# Patient Record
Sex: MALE | Race: WHITE | Employment: OTHER | URBAN - METROPOLITAN AREA
[De-identification: names, ages, dates, MRNs, and addresses within clinical notes are randomized per-mention and may not be internally consistent; named-entity substitution may affect disease eponyms.]

---

## 2017-06-15 ENCOUNTER — HOSPITAL ENCOUNTER (OUTPATIENT)
Age: 52
Discharge: HOME | End: 2017-06-15
Payer: MEDICAID

## 2017-06-15 ENCOUNTER — TELEPHONE (OUTPATIENT)
Dept: FAMILY MEDICINE CLINIC | Facility: CLINIC | Age: 52
End: 2017-06-15

## 2017-06-15 VITALS
BODY MASS INDEX: 25.9 KG/M2 | DIASTOLIC BLOOD PRESSURE: 82 MMHG | HEART RATE: 83 BPM | TEMPERATURE: 99 F | OXYGEN SATURATION: 96 % | SYSTOLIC BLOOD PRESSURE: 133 MMHG | HEIGHT: 67 IN | RESPIRATION RATE: 18 BRPM | WEIGHT: 165 LBS

## 2017-06-15 DIAGNOSIS — K04.7 DENTAL ABSCESS: Primary | ICD-10-CM

## 2017-06-15 PROCEDURE — 85025 COMPLETE CBC W/AUTO DIFF WBC: CPT | Performed by: NURSE PRACTITIONER

## 2017-06-15 PROCEDURE — 96365 THER/PROPH/DIAG IV INF INIT: CPT

## 2017-06-15 PROCEDURE — S0077 INJECTION, CLINDAMYCIN PHOSP: HCPCS

## 2017-06-15 PROCEDURE — 99204 OFFICE O/P NEW MOD 45 MIN: CPT

## 2017-06-15 RX ORDER — CLINDAMYCIN PHOSPHATE 600 MG/50ML
600 INJECTION INTRAVENOUS ONCE
Status: COMPLETED | OUTPATIENT
Start: 2017-06-15 | End: 2017-06-15

## 2017-06-15 RX ORDER — CLINDAMYCIN HYDROCHLORIDE 300 MG/1
300 CAPSULE ORAL 3 TIMES DAILY
Qty: 30 CAPSULE | Refills: 0 | Status: SHIPPED | OUTPATIENT
Start: 2017-06-15 | End: 2017-06-25

## 2017-06-15 RX ORDER — CLINDAMYCIN PHOSPHATE 600 MG/50ML
600 INJECTION INTRAVENOUS EVERY 8 HOURS
Status: DISCONTINUED | OUTPATIENT
Start: 2017-06-15 | End: 2017-06-15

## 2017-06-15 NOTE — TELEPHONE ENCOUNTER
Kindly call patient and ask if he would like to follow up after IC visit and establish care with me as his PCP. Thank you.

## 2017-06-15 NOTE — ED PROVIDER NOTES
Patient Seen in: [facility]    History   Patient presents with:  Dental Problem (dental)    Stated Complaint: dental pain x 2 days    HPI  29-year-old male who presents to the immediate care with complaints of dental abscess for Negative. All other systems reviewed and are negative. Positive for stated complaint: dental pain x 2 days  Other systems are as noted in HPI. Constitutional and vital signs reviewed.       All other systems reviewed and negative except as noted ab Normal range of motion. Neurological: He is alert and oriented to person, place, and time. Skin: Skin is warm. Psychiatric: He has a normal mood and affect.  His behavior is normal. Judgment and thought content normal.   Nursing note and vitals review

## 2017-06-15 NOTE — ED INITIAL ASSESSMENT (HPI)
Patient presents with cc of \"rotten teeth\". Needs full set of dentures. States abscess right upper. No fever or chills. +Swelling to right face eye. States symptoms x 2 days. Low grade fever here. Got insurance recently.

## 2017-08-04 ENCOUNTER — HOSPITAL ENCOUNTER (OUTPATIENT)
Age: 52
Discharge: HOME | End: 2017-08-04
Attending: EMERGENCY MEDICINE
Payer: MEDICAID

## 2017-08-04 VITALS
SYSTOLIC BLOOD PRESSURE: 136 MMHG | OXYGEN SATURATION: 98 % | HEART RATE: 78 BPM | TEMPERATURE: 98 F | RESPIRATION RATE: 18 BRPM | WEIGHT: 170 LBS | HEIGHT: 67 IN | BODY MASS INDEX: 26.68 KG/M2 | DIASTOLIC BLOOD PRESSURE: 84 MMHG

## 2017-08-04 DIAGNOSIS — K04.7 DENTAL ABSCESS: Primary | ICD-10-CM

## 2017-08-04 PROCEDURE — 99213 OFFICE O/P EST LOW 20 MIN: CPT

## 2017-08-04 PROCEDURE — 99214 OFFICE O/P EST MOD 30 MIN: CPT

## 2017-08-04 RX ORDER — AMOXICILLIN 875 MG/1
875 TABLET, COATED ORAL 2 TIMES DAILY
Qty: 20 TABLET | Refills: 0 | Status: SHIPPED | OUTPATIENT
Start: 2017-08-04 | End: 2017-08-14

## 2017-08-05 NOTE — ED PROVIDER NOTES
Patient presents with:  Tooth Ache    HPI:     Tricia Remy is a 50year old male who presents with chief complaint of facial swelling. Started 2 days ago with toothache to the R upper jaw area.   Today with swelling to the R cheek and feels like he can

## 2017-08-05 NOTE — ED INITIAL ASSESSMENT (HPI)
Patient states 2 days ago developed toothache to right upper side  Swelling  States feels drainage  Dental appt on Monday  Needs antibiotics and states pain is minimal

## 2018-01-24 ENCOUNTER — HOSPITAL ENCOUNTER (OUTPATIENT)
Age: 53
Discharge: HOME | End: 2018-01-24
Payer: MEDICAID

## 2018-01-24 VITALS
HEART RATE: 68 BPM | OXYGEN SATURATION: 97 % | WEIGHT: 170 LBS | RESPIRATION RATE: 20 BRPM | BODY MASS INDEX: 26.68 KG/M2 | DIASTOLIC BLOOD PRESSURE: 95 MMHG | HEIGHT: 67 IN | SYSTOLIC BLOOD PRESSURE: 140 MMHG | TEMPERATURE: 99 F

## 2018-01-24 DIAGNOSIS — K04.7 DENTAL INFECTION: ICD-10-CM

## 2018-01-24 DIAGNOSIS — R22.0 RIGHT FACIAL SWELLING: Primary | ICD-10-CM

## 2018-01-24 PROCEDURE — 96372 THER/PROPH/DIAG INJ SC/IM: CPT

## 2018-01-24 PROCEDURE — 99214 OFFICE O/P EST MOD 30 MIN: CPT

## 2018-01-24 RX ORDER — AMOXICILLIN AND CLAVULANATE POTASSIUM 875; 125 MG/1; MG/1
1 TABLET, FILM COATED ORAL 2 TIMES DAILY
Qty: 20 TABLET | Refills: 0 | Status: SHIPPED | OUTPATIENT
Start: 2018-01-24 | End: 2018-02-03

## 2018-01-24 NOTE — ED PROVIDER NOTES
Patient Seen in: [facility]    History   Patient presents with:  Swelling    Stated Complaint: face swollen had teeth removed     HPI    Olga Marshall is a 57-year-old male who comes in today complaining of his denture top plate slipp Mouth/Throat: Uvula is midline, oropharynx is clear and moist and mucous membranes are normal. He has dentures. Abnormal dentition. No oropharyngeal exudate. Eyes: Conjunctivae are normal. Pupils are equal, round, and reactive to light.    Neck: Lou Seton Schedule an appointment as soon as possible for a visit in 1 day          Medications Prescribed:  Current Discharge Medication List    START taking these medications    Amoxicillin-Pot Clavulanate 875-125 MG Oral Tab  Take 1 tablet by mouth 2 (two) times

## 2018-01-24 NOTE — ED INITIAL ASSESSMENT (HPI)
Right side facial swelling started yesterday. States denture top plate slipped and he felt pain on site two days ago.

## (undated) NOTE — ED AVS SNAPSHOT
[facility] [address]    Phone:  [phone]    Fax:  [address]   MRN: [id number]    Department:  [facility]   Date of Visit:  6/15/2017 Follow up with your dentist in one to 2 days for reevaluation and further care   Ibuprofen 600mg every 6 hours as needed for pain control  Take antibiotics as directed please take and finish all of the antibiotics  If you do not have a dentist followup wit primary care or a specialist physician for a follow-up visit, please tell this physician (or your personal doctor if your instructions are to return to your personal doctor) about any new or lasting problems.  The primary care or specialist physician will s - If you are a smoker or have smoked in the last 12 months, we encourage you to explore options for quitting.     - If you have concerns related to behavioral health issues or thoughts of harming yourself, contact [facility]